# Patient Record
Sex: MALE | Race: WHITE | HISPANIC OR LATINO | Employment: UNEMPLOYED | URBAN - METROPOLITAN AREA
[De-identification: names, ages, dates, MRNs, and addresses within clinical notes are randomized per-mention and may not be internally consistent; named-entity substitution may affect disease eponyms.]

---
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
## 2023-09-05 NOTE — OP REPORT
DATE OF SERVICE:  03/26/2018    PREOPERATIVE DIAGNOSIS:  Acute appendicitis.    POSTOPERATIVE DIAGNOSIS:  Acute non-ruptured appendicitis with localized   peritonitis.    SURGEON:  Kira Lima MD    ANESTHESIOLOGIST:  Isaiah Dee MD    ANESTHESIA:  GET.    FINDINGS:  No signs of perforation, small bowel adherent to the cecum and   appendix as well as the right lower quadrant abdominal wall.    COMPLICATIONS:  None.    ESTIMATED BLOOD LOSS:  10 mL.    PROCEDURE IN DETAIL:  Patient was consented preoperatively with his parents   present, taken to the operating room, placed in a supine position.  Anesthesia   was induced.  He was endotracheally intubated without difficulty.  A time-out   was performed.  Preoperatively, patient urinated to decompress his bladder.    His abdomen was then was then prepped and draped.  A 2 cm supraumbilical   incision was made to place a 12 mm Chanelle port under direct visualization.    The abdomen was then insufflated to 12 mmHg.  A 5 mm 30-degree camera was then   placed within the abdomen and two 5 mm ports were placed, one in the   suprapubic region, one in the epigastrium with care not to injure the bladder.    Patient was then airplaned to his left side down.  He had a small bowel loop   that was adhesed to the cecum and the right lower quadrant abdominal wall had   to be carefully mobilized to expose the appendix.  The small bowel loop was   mobilized with a combination of blunt and sharp dissection with care not to   injure the bowel.  The cecum was then rotated slightly medially.  The appendix   was grasped and retracted towards the liver.  The mesoappendix divided with   harmonic device, exposing the base and the base was transected with Endo-PIERCE   stapler using a blue load.  The appendix was nonperforated.  There was   localized peritonitis in the right lower quadrant, but no sign of perforation.    The appendix placed in the EndoCatch bag was withdrawn from the abdomen.     Staple line was inspected, it was intact.  There was no sign of bleeding and   the right lower quadrant was then irrigated until the irrigant ran clear. The   5 mm ports were removed under direct visualization.  There was no bleeding   from the abdominal wall.  The abdomen was then desufflated.  The periumbilical   fascia incision closed with a figure-of-eight using 0 Vicryl.  All skin   incisions closed with a subcuticular stitch using 4-0 Monocryl, Steri-Strips,   Tegaderm dressing was then applied.  All lap, sponge and instrument counts   were correct at the end of the case x2.  The patient tolerated the procedure   well.  He was awakened from anesthesia, extubated, taken to the postanesthesia   care unit in good condition.       ____________________________________     MD JESSICA Tinoco / BELINDA    DD:  03/26/2018 09:53:55  DT:  03/26/2018 10:50:00    D#:  [id number]  Job#:  [id number]   show

## 2023-09-22 ENCOUNTER — HOSPITAL ENCOUNTER (EMERGENCY)
Facility: MEDICAL CENTER | Age: 18
End: 2023-09-23
Attending: STUDENT IN AN ORGANIZED HEALTH CARE EDUCATION/TRAINING PROGRAM
Payer: MEDICAID

## 2023-09-22 DIAGNOSIS — R20.2 PARESTHESIA OF RIGHT LOWER EXTREMITY: ICD-10-CM

## 2023-09-22 DIAGNOSIS — M54.50 CHRONIC MIDLINE LOW BACK PAIN, UNSPECIFIED WHETHER SCIATICA PRESENT: ICD-10-CM

## 2023-09-22 DIAGNOSIS — G89.29 CHRONIC MIDLINE LOW BACK PAIN, UNSPECIFIED WHETHER SCIATICA PRESENT: ICD-10-CM

## 2023-09-22 PROCEDURE — 99283 EMERGENCY DEPT VISIT LOW MDM: CPT

## 2023-09-23 ENCOUNTER — APPOINTMENT (OUTPATIENT)
Dept: RADIOLOGY | Facility: MEDICAL CENTER | Age: 18
End: 2023-09-23
Attending: STUDENT IN AN ORGANIZED HEALTH CARE EDUCATION/TRAINING PROGRAM
Payer: MEDICAID

## 2023-09-23 VITALS
HEART RATE: 67 BPM | DIASTOLIC BLOOD PRESSURE: 63 MMHG | OXYGEN SATURATION: 97 % | RESPIRATION RATE: 17 BRPM | WEIGHT: 178.35 LBS | SYSTOLIC BLOOD PRESSURE: 112 MMHG | TEMPERATURE: 97.3 F | BODY MASS INDEX: 27.99 KG/M2 | HEIGHT: 67 IN

## 2023-09-23 LAB
ALBUMIN SERPL BCP-MCNC: 5.1 G/DL (ref 3.2–4.9)
ALBUMIN/GLOB SERPL: 1.8 G/DL
ALP SERPL-CCNC: 69 U/L (ref 30–99)
ALT SERPL-CCNC: 15 U/L (ref 2–50)
ANION GAP SERPL CALC-SCNC: 14 MMOL/L (ref 7–16)
AST SERPL-CCNC: 19 U/L (ref 12–45)
BASOPHILS # BLD AUTO: 0.7 % (ref 0–1.8)
BASOPHILS # BLD: 0.04 K/UL (ref 0–0.12)
BILIRUB SERPL-MCNC: 0.4 MG/DL (ref 0.1–1.5)
BUN SERPL-MCNC: 20 MG/DL (ref 8–22)
CALCIUM ALBUM COR SERPL-MCNC: 8.3 MG/DL (ref 8.5–10.5)
CALCIUM SERPL-MCNC: 9.2 MG/DL (ref 8.5–10.5)
CHLORIDE SERPL-SCNC: 100 MMOL/L (ref 96–112)
CO2 SERPL-SCNC: 26 MMOL/L (ref 20–33)
CREAT SERPL-MCNC: 0.84 MG/DL (ref 0.5–1.4)
CRP SERPL HS-MCNC: <0.3 MG/DL (ref 0–0.75)
EOSINOPHIL # BLD AUTO: 0.04 K/UL (ref 0–0.51)
EOSINOPHIL NFR BLD: 0.7 % (ref 0–6.9)
ERYTHROCYTE [DISTWIDTH] IN BLOOD BY AUTOMATED COUNT: 38.1 FL (ref 35.9–50)
ERYTHROCYTE [SEDIMENTATION RATE] IN BLOOD BY WESTERGREN METHOD: 6 MM/HOUR (ref 0–20)
GFR SERPLBLD CREATININE-BSD FMLA CKD-EPI: 128 ML/MIN/1.73 M 2
GLOBULIN SER CALC-MCNC: 2.8 G/DL (ref 1.9–3.5)
GLUCOSE SERPL-MCNC: 88 MG/DL (ref 65–99)
HCT VFR BLD AUTO: 48.1 % (ref 42–52)
HGB BLD-MCNC: 16.2 G/DL (ref 14–18)
IMM GRANULOCYTES # BLD AUTO: 0.01 K/UL (ref 0–0.11)
IMM GRANULOCYTES NFR BLD AUTO: 0.2 % (ref 0–0.9)
LYMPHOCYTES # BLD AUTO: 1.9 K/UL (ref 1–4.8)
LYMPHOCYTES NFR BLD: 34.1 % (ref 22–41)
MCH RBC QN AUTO: 27.7 PG (ref 27–33)
MCHC RBC AUTO-ENTMCNC: 33.7 G/DL (ref 32.3–36.5)
MCV RBC AUTO: 82.4 FL (ref 81.4–97.8)
MONOCYTES # BLD AUTO: 0.32 K/UL (ref 0–0.85)
MONOCYTES NFR BLD AUTO: 5.7 % (ref 0–13.4)
NEUTROPHILS # BLD AUTO: 3.27 K/UL (ref 1.82–7.42)
NEUTROPHILS NFR BLD: 58.6 % (ref 44–72)
NRBC # BLD AUTO: 0 K/UL
NRBC BLD-RTO: 0 /100 WBC (ref 0–0.2)
PLATELET # BLD AUTO: 294 K/UL (ref 164–446)
PMV BLD AUTO: 9.8 FL (ref 9–12.9)
POTASSIUM SERPL-SCNC: 3.8 MMOL/L (ref 3.6–5.5)
PROT SERPL-MCNC: 7.9 G/DL (ref 6–8.2)
RBC # BLD AUTO: 5.84 M/UL (ref 4.7–6.1)
SODIUM SERPL-SCNC: 140 MMOL/L (ref 135–145)
WBC # BLD AUTO: 5.6 K/UL (ref 4.8–10.8)

## 2023-09-23 PROCEDURE — 80053 COMPREHEN METABOLIC PANEL: CPT

## 2023-09-23 PROCEDURE — 85025 COMPLETE CBC W/AUTO DIFF WBC: CPT

## 2023-09-23 PROCEDURE — 72148 MRI LUMBAR SPINE W/O DYE: CPT

## 2023-09-23 PROCEDURE — 36415 COLL VENOUS BLD VENIPUNCTURE: CPT

## 2023-09-23 PROCEDURE — 85652 RBC SED RATE AUTOMATED: CPT

## 2023-09-23 PROCEDURE — 86140 C-REACTIVE PROTEIN: CPT

## 2023-09-23 PROCEDURE — 72100 X-RAY EXAM L-S SPINE 2/3 VWS: CPT

## 2023-09-23 NOTE — ED NOTES
Bedside report received by Hugo MO    Oxygen:RA  Fall Risk status:N/A  No isolation    Pt aware of POC, call light within reach, no needs at this time
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
## 2023-09-23 NOTE — ED NOTES
Pt understands DC instructions and follow up, DC paperwork provided, IV removed, pt ambulated with steady gait to [facility] snow for DC

## 2023-09-23 NOTE — DISCHARGE INSTRUCTIONS
You were seen in the emergency room for evaluation of back pain.  You had a MRI of your L-spine done that shows no acute abnormalities. Please follow up with your primary care doctor. Return to the ER for any worsening back pain, urinary or bowel incontinence, inability to walk or any other concerns
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
## 2024-09-08 ENCOUNTER — HOSPITAL ENCOUNTER (EMERGENCY)
Facility: MEDICAL CENTER | Age: 19
End: 2024-09-08
Attending: STUDENT IN AN ORGANIZED HEALTH CARE EDUCATION/TRAINING PROGRAM
Payer: MEDICAID

## 2024-09-08 ENCOUNTER — APPOINTMENT (OUTPATIENT)
Dept: RADIOLOGY | Facility: MEDICAL CENTER | Age: 19
End: 2024-09-08
Attending: STUDENT IN AN ORGANIZED HEALTH CARE EDUCATION/TRAINING PROGRAM
Payer: MEDICAID

## 2024-09-08 VITALS
HEIGHT: 67 IN | HEART RATE: 60 BPM | TEMPERATURE: 98.2 F | DIASTOLIC BLOOD PRESSURE: 58 MMHG | OXYGEN SATURATION: 98 % | WEIGHT: 161.38 LBS | SYSTOLIC BLOOD PRESSURE: 116 MMHG | BODY MASS INDEX: 25.33 KG/M2 | RESPIRATION RATE: 16 BRPM

## 2024-09-08 DIAGNOSIS — S61.022A LACERATION OF LEFT THUMB WITH FOREIGN BODY WITHOUT DAMAGE TO NAIL, INITIAL ENCOUNTER: ICD-10-CM

## 2024-09-08 PROCEDURE — 303747 HCHG EXTRA SUTURE

## 2024-09-08 PROCEDURE — 700111 HCHG RX REV CODE 636 W/ 250 OVERRIDE (IP): Performed by: STUDENT IN AN ORGANIZED HEALTH CARE EDUCATION/TRAINING PROGRAM

## 2024-09-08 PROCEDURE — 304217 HCHG IRRIGATION SYSTEM

## 2024-09-08 PROCEDURE — 73130 X-RAY EXAM OF HAND: CPT | Mod: LT

## 2024-09-08 PROCEDURE — 99283 EMERGENCY DEPT VISIT LOW MDM: CPT

## 2024-09-08 PROCEDURE — 90715 TDAP VACCINE 7 YRS/> IM: CPT | Performed by: STUDENT IN AN ORGANIZED HEALTH CARE EDUCATION/TRAINING PROGRAM

## 2024-09-08 PROCEDURE — 90471 IMMUNIZATION ADMIN: CPT

## 2024-09-08 PROCEDURE — 700101 HCHG RX REV CODE 250: Performed by: STUDENT IN AN ORGANIZED HEALTH CARE EDUCATION/TRAINING PROGRAM

## 2024-09-08 PROCEDURE — 304999 HCHG REPAIR-SIMPLE/INTERMED LEVEL 1

## 2024-09-08 RX ORDER — LIDOCAINE HYDROCHLORIDE 20 MG/ML
20 INJECTION, SOLUTION INFILTRATION; PERINEURAL ONCE
Status: COMPLETED | OUTPATIENT
Start: 2024-09-08 | End: 2024-09-08

## 2024-09-08 RX ADMIN — LIDOCAINE HYDROCHLORIDE 20 ML: 20 INJECTION, SOLUTION INFILTRATION; PERINEURAL at 22:15

## 2024-09-08 RX ADMIN — CLOSTRIDIUM TETANI TOXOID ANTIGEN (FORMALDEHYDE INACTIVATED), CORYNEBACTERIUM DIPHTHERIAE TOXOID ANTIGEN (FORMALDEHYDE INACTIVATED), BORDETELLA PERTUSSIS TOXOID ANTIGEN (GLUTARALDEHYDE INACTIVATED), BORDETELLA PERTUSSIS FILAMENTOUS HEMAGGLUTININ ANTIGEN (FORMALDEHYDE INACTIVATED), BORDETELLA PERTUSSIS PERTACTIN ANTIGEN, AND BORDETELLA PERTUSSIS FIMBRIAE 2/3 ANTIGEN 0.5 ML: 5; 2; 2.5; 5; 3; 5 INJECTION, SUSPENSION INTRAMUSCULAR at 23:27

## 2024-09-08 RX ADMIN — Medication 3 ML: at 22:15

## 2024-09-08 ASSESSMENT — FIBROSIS 4 INDEX: FIB4 SCORE: 0.35

## 2024-09-09 NOTE — DISCHARGE INSTRUCTIONS
You will need to have your stitches taken out in 10 days.  You develop fever, pus from your wound, uncontrolled pain or swelling please return to emergency room as these could be signs of wound infection.  We updated your tetanus.  You can go to your primary care doctor and urgent care or come back to emergency department to have your stitches taken out.  Keep your wound dry for the next 24 hours.  After that you can bathe normally but no swimming or hot tubs.

## 2024-09-09 NOTE — ED PROVIDER NOTES
"ED Provider Note    CHIEF COMPLAINT  Chief Complaint   Patient presents with    Hand Laceration     Left hand       EXTERNAL RECORDS REVIEWED  Other ER visit from 9/23/2023 patient seen for low back pain    HPI/ROS  LIMITATION TO HISTORY     OUTSIDE HISTORIAN(S):      López De Souza is a 19 y.o. male who presents with hand laceration.  Patient says he was cleaning a ceramic mug when it accidentally broke and cut his left thumb.  Patient says he washed his injury with water and soap and had ongoing bleeding.  Patient says he had appropriate vaccinations as a child.    PAST MEDICAL HISTORY       SURGICAL HISTORY   has a past surgical history that includes appendectomy laparoscopic (3/26/2018).    FAMILY HISTORY  Family History   Problem Relation Age of Onset    No Known Problems Mother     No Known Problems Father     Hypertension Maternal Grandmother     Diabetes Maternal Grandfather     No Known Problems Paternal Grandmother     No Known Problems Paternal Grandfather        SOCIAL HISTORY  Social History     Tobacco Use    Smoking status: Never    Smokeless tobacco: Never   Vaping Use    Vaping status: Never Used   Substance and Sexual Activity    Alcohol use: Yes     Comment: rarely    Drug use: No    Sexual activity: Not on file       CURRENT MEDICATIONS  Home Medications       Reviewed by Jimmy Poon R.N. (Registered Nurse) on 09/08/24 at 2105  Med List Status: <None>     Medication Last Dose Status        Patient Zaid Taking any Medications                           ALLERGIES  No Known Allergies    PHYSICAL EXAM  VITAL SIGNS: /58   Pulse 60   Temp 36.8 °C (98.2 °F) (Temporal)   Resp 16   Ht 1.702 m (5' 7\")   Wt 73.2 kg (161 lb 6 oz)   SpO2 98%   BMI 25.28 kg/m²    Constitutional: Alert in no apparent distress.  HENT: No signs of trauma, Bilateral external ears normal, Nose normal.   Eyes: Pupils are equal and reactive, Conjunctiva normal, Non-icteric.   Neck: Normal range of motion, No " tenderness, Supple, No stridor.   Cardiovascular: Regular rate and rhythm, no murmurs.   Thorax & Lungs: Normal breath sounds, No respiratory distress, No wheezing, No chest tenderness.   Abdomen: Bowel sounds normal, Soft, No tenderness, No masses, No pulsatile masses.   Skin: Warm, Dry, No erythema, No rash.   Back: No bony tenderness  Extremities: Intact distal pulses, No edema, No tenderness, No cyanosis  Musculoskeletal: Approximately 5 cm laceration along the dorsum of the left thumb, normal flexion and extension throughout the left thumb, normal cap refill otherwise good range of motion in all major joints. No tenderness to palpation or major deformities noted.   Neurologic: Alert , Normal motor function, Normal sensory function, No focal deficits noted.         RADIOLOGY/PROCEDURES   I have independently interpreted the diagnostic imaging associated with this visit and am waiting the final reading from the radiologist.   My preliminary interpretation is as follows: No fracture of the left hand    Radiologist interpretation:  DX-HAND 3+ LEFT   Final Result         Tiny radiopaque foreign body in the soft tissue near the medial aspect of the first IP joint          COURSE & MEDICAL DECISION MAKING    ASSESSMENT, COURSE AND PLAN  Care Narrative: On arrival patient noted to have laceration across the dorsum of his left thumb.  Patient does not have any signs of neurovascular injury, tendon injury.  As patient reported that he injured his finger on a broken ceramic mug obtain x-ray to assess for radiopaque foreign body.  Patient did have a very small radiopaque foreign body along the medial aspect of his wound.  After performing digital nerve block as noted below was able to for patient's wound and identify small whitish foreign body.  Patient's wound was thoroughly irrigated with a liter of normal saline.  With pressure patient achieved adequate hemostasis.  Patient's laceration was repaired as noted below.   Patient was given updated tetanus.  Discussed with patient outpatient follow-up for suture removal as well as return precautions for signs of neurovascular compromise or wound infection.    Peripheral Block     Date: 9/8/24  Performed by: Jerald Zamudio DO  Authorized by: Jerald Zamudio DO     Patient Location:  ED  Start Time: 9/8/2024 6115  Reason for Block: Thumb laceration  Patient Position:  Supine  Prep: Isopropyl alcohol  Monitoring:  Heart rate, continuous pulse ox and cardiac monitor  Block Region: Left thumb  Block Type: Digital nerve block  Laterality: Left  Local Infiltration: Lidocaine  Strength:  2%  Dose:  5 mL  Block Type:  Single-shot  Needle Gauge:  22 G  Injection Assessment:  Negative aspiration for heme, no paresthesia on injection, incremental injection  Evidence of intravascular injection: No    Neurovascular status: Intact      Laceration Repair Procedure Note    Indication: Laceration    Procedure: The patient was placed in the appropriate position and anesthesia around the laceration was obtained with a full digital block of the left thumb using 2% Lidocaine without epinephrine. The wound was minimally contaminated .The area was then irrigated with normal saline. The laceration was closed with 5-0 Ethilon using interrupted sutures. There were no additional lacerations requiring repair. The wound area was then dressed with a sterile dressing.      Total repaired wound length: 5 cm.     Other Items: Suture count: 4    The patient tolerated the procedure well.    Complications: None                  ADDITIONAL PROBLEMS MANAGED      DISPOSITION AND DISCUSSIONS  Barriers to care at this time, including but not limited to: Patient does not have established PCP.       FINAL DIAGNOSIS  1. Laceration of left thumb with foreign body without damage to nail, initial encounter         Electronically signed by: Jerald Zamudio D.O., 9/8/2024 9:46 PM

## 2024-09-09 NOTE — ED NOTES
PT given dishcarge instructions, and all questions answered. PT discharged in stable condition. PT ambulated out to lobby with steady gait accompanied by significant other.

## 2024-09-09 NOTE — ED TRIAGE NOTES
"Chief Complaint   Patient presents with    Hand Laceration     Left hand     Patient ambulatory to triage accompanied by girlfriend for the above complaint. Patient states that he cut himself with a broken glass. Dressing applied in triage and bleeding controlled.    Patient unsure of last TDAP.    Pt is alert and oriented, speaking in full sentences, follows commands and responds appropriately to questions. Resp are even and unlabored.      Pt placed in lobby. Pt educated on triage process. Pt encouraged to alert staff for any changes.     Patient and staff wearing appropriate PPE.    /52   Pulse 65   Temp 36.6 °C (97.9 °F) (Temporal)   Resp 16   Ht 1.702 m (5' 7\")   Wt 73.2 kg (161 lb 6 oz)   SpO2 96%     "

## 2024-09-19 ENCOUNTER — OFFICE VISIT (OUTPATIENT)
Dept: URGENT CARE | Facility: CLINIC | Age: 19
End: 2024-09-19
Payer: MEDICAID

## 2024-09-19 VITALS
OXYGEN SATURATION: 98 % | DIASTOLIC BLOOD PRESSURE: 64 MMHG | HEIGHT: 68 IN | TEMPERATURE: 97.8 F | HEART RATE: 107 BPM | BODY MASS INDEX: 24.64 KG/M2 | SYSTOLIC BLOOD PRESSURE: 100 MMHG | RESPIRATION RATE: 12 BRPM | WEIGHT: 162.6 LBS

## 2024-09-19 DIAGNOSIS — Z48.02 VISIT FOR SUTURE REMOVAL: ICD-10-CM

## 2024-09-19 PROCEDURE — 3078F DIAST BP <80 MM HG: CPT

## 2024-09-19 PROCEDURE — 3074F SYST BP LT 130 MM HG: CPT

## 2024-09-19 PROCEDURE — 99212 OFFICE O/P EST SF 10 MIN: CPT

## 2024-09-19 PROCEDURE — 15853 REMOVAL SUTR/STAPL XREQ ANES: CPT

## 2024-09-19 ASSESSMENT — FIBROSIS 4 INDEX: FIB4 SCORE: 0.35

## 2024-09-19 ASSESSMENT — ENCOUNTER SYMPTOMS: FEVER: 0

## 2024-09-20 NOTE — PROGRESS NOTES
"Subjective:     CHIEF COMPLAINT  Chief Complaint   Patient presents with    Suture / Staple Removal     X1 week , wants stiches out .       HPI  López De Souza is a very pleasant 19 y.o. male who presents for a suture removal to a laceration to the dorsal surface of his left thumb.  He was seen in the emergency department on 9/8/2024, 11 days ago, and had 4 simple interrupted sutures placed.  He has been gently washing the laceration site with warm soapy water.  He has not noticed any drainage or redness.  He is otherwise feeling well.       REVIEW OF SYSTEMS  Review of Systems   Constitutional:  Negative for fever.       PAST MEDICAL HISTORY  Patient Active Problem List    Diagnosis Date Noted    Encounter to establish care 08/03/2020    Need for vaccination 08/03/2020       SURGICAL HISTORY   has a past surgical history that includes appendectomy laparoscopic (3/26/2018).    ALLERGIES  No Known Allergies    CURRENT MEDICATIONS  Home Medications       Reviewed by Penelope Webb P.A.-C. (Physician Assistant) on 09/19/24 at 1726  Med List Status: <None>     Medication Last Dose Status        Patient Zaid Taking any Medications                           SOCIAL HISTORY  Social History     Tobacco Use    Smoking status: Never    Smokeless tobacco: Never   Vaping Use    Vaping status: Never Used   Substance and Sexual Activity    Alcohol use: Yes     Comment: rarely    Drug use: Yes     Types: Marijuana     Comment: rare    Sexual activity: Not on file       FAMILY HISTORY  Family History   Problem Relation Age of Onset    No Known Problems Mother     No Known Problems Father     Hypertension Maternal Grandmother     Diabetes Maternal Grandfather     No Known Problems Paternal Grandmother     No Known Problems Paternal Grandfather           Objective:     VITAL SIGNS: /64   Pulse (!) 107   Temp 36.6 °C (97.8 °F)   Resp 12   Ht 1.727 m (5' 8\")   Wt 73.8 kg (162 lb 9.6 oz)   SpO2 98%   BMI 24.72 " kg/m²     PHYSICAL EXAM  Physical Exam  Vitals reviewed.   Constitutional:       General: He is not in acute distress.     Appearance: Normal appearance. He is not ill-appearing or toxic-appearing.   HENT:      Head: Normocephalic and atraumatic.      Mouth/Throat:      Mouth: Mucous membranes are moist.   Eyes:      Conjunctiva/sclera: Conjunctivae normal.      Pupils: Pupils are equal, round, and reactive to light.   Pulmonary:      Effort: Pulmonary effort is normal. No respiratory distress.   Musculoskeletal:        Hands:       Comments: 4 simple interrupted sutures in place.  No erythema, swelling, or drainage surrounding the laceration site.  No foreign bodies.  Wound edges are approximated with appropriate healing.   Skin:     General: Skin is warm and dry.   Neurological:      General: No focal deficit present.      Mental Status: He is alert and oriented to person, place, and time.   Psychiatric:         Mood and Affect: Mood normal.       Suture Removal    Date/Time: 9/19/2024 5:41 PM    Performed by: Penelope Webb P.A.-C.  Authorized by: Penelope Webb P.A.-C.  Body area: upper extremity  Location details: left thumb  Wound Appearance: clean  Sutures Removed: 4  Post-removal: Steri-Strips applied  Patient tolerance: patient tolerated the procedure well with no immediate complications  Comments: Distal portion of laceration without complete healing upon removal of sutures.  2 Steri-Strips applied.          Assessment/Plan:     1. Visit for suture removal    Other orders  - Suture Removal  -Tylenol over-the-counter for discomfort if needed  -Return to clinic as needed    MDM/Comments:  Patient has stable vital signs and is non-toxic appearing.  4 simple interrupted sutures have been removed in the office.  2 Steri-Strips have been placed to the distal portion of the laceration or incomplete healing was present.  No evidence of infection or contamination. Patient demonstrated understanding of  treatment plan at this time and will RTC as needed.    Differential diagnosis, natural history, supportive care, and indications for immediate follow-up discussed. All questions answered. Patient agrees with the plan of care.    Follow-up as needed if symptoms worsen or fail to improve to PCP, Urgent care or Emergency Room.    I have personally reviewed prior external notes and test results pertinent to today's visit.  I have independently reviewed and interpreted all diagnostics ordered during this urgent care acute visit.   Discussed management options (risks,benefits, and alternatives to treatment). Pt expresses understanding and the treatment plan was agreed upon. Questions were encouraged and answered to pt's satisfaction.    Please note that this dictation was created using voice recognition software. I have made a reasonable attempt to correct obvious errors, but I expect that there are errors of grammar and possibly content that I did not discover before finalizing the note.

## 2024-11-30 ENCOUNTER — OFFICE VISIT (OUTPATIENT)
Dept: URGENT CARE | Facility: CLINIC | Age: 19
End: 2024-11-30
Payer: MEDICAID

## 2024-11-30 VITALS
TEMPERATURE: 98 F | HEART RATE: 64 BPM | SYSTOLIC BLOOD PRESSURE: 110 MMHG | BODY MASS INDEX: 26.68 KG/M2 | OXYGEN SATURATION: 99 % | DIASTOLIC BLOOD PRESSURE: 72 MMHG | HEIGHT: 67 IN | RESPIRATION RATE: 16 BRPM | WEIGHT: 170 LBS

## 2024-11-30 DIAGNOSIS — S39.94XA INJURY TO SCROTUM, PENIS, OR FORESKIN, INITIAL ENCOUNTER: ICD-10-CM

## 2024-11-30 PROBLEM — M54.6 THORACIC BACK PAIN: Status: ACTIVE | Noted: 2024-01-27

## 2024-11-30 PROBLEM — M43.06 ACQUIRED SPONDYLOLYSIS OF LUMBAR SPINE: Status: ACTIVE | Noted: 2023-04-27

## 2024-11-30 PROBLEM — R63.8 INCREASED BODY MASS INDEX: Status: ACTIVE | Noted: 2023-09-28

## 2024-11-30 ASSESSMENT — FIBROSIS 4 INDEX: FIB4 SCORE: 0.35

## 2024-11-30 NOTE — LETTER
November 30, 2024         Patient: López De Souza   YOB: 2005   Date of Visit: 11/30/2024           To Whom it May Concern:    López De Souza was seen in my clinic on 11/30/2024. He may be excused from work today.    If you have any questions or concerns, please don't hesitate to call.        Sincerely,           LAUREEN Solis.  Electronically Signed

## 2024-12-01 NOTE — PROGRESS NOTES
Chief Complaint   Patient presents with    Penis Injury     Injury Happened 1 day ago, tip of penis possibly injured. Blood came out after intercourse.        HISTORY OF PRESENT ILLNESS: Patient is a pleasant 19 y.o. male who presents to urgent care today with concerns of an injury to his foreskin.  The patient was having sexual relations last night with his girlfriend when he felt pain and noticed bleeding to his foreskin.  He has had continued bleeding today.  He has washed the area with alcohol.  Denies any changes in urination.    Patient Active Problem List    Diagnosis Date Noted    Thoracic back pain 01/27/2024    Increased body mass index 09/28/2023    Acquired spondylolysis of lumbar spine 04/27/2023    Encounter to establish care 08/03/2020    Need for vaccination 08/03/2020       Allergies:Patient has no known allergies.    No current [employer]-ordered outpatient medications on file.     No current [employer]-ordered facility-administered medications on file.       History reviewed. No pertinent past medical history.    Social History     Tobacco Use    Smoking status: Never    Smokeless tobacco: Never   Vaping Use    Vaping status: Never Used   Substance Use Topics    Alcohol use: Yes     Comment: rarely    Drug use: Yes     Types: Marijuana     Comment: rare       Family Status   Relation Name Status    Mo  Alive    Fa  Alive    MGMo  Alive    MGFa  Alive    PGMo  Alive    PGFa  Alive   No partnership data on file     Family History   Problem Relation Age of Onset    No Known Problems Mother     No Known Problems Father     Hypertension Maternal Grandmother     Diabetes Maternal Grandfather     No Known Problems Paternal Grandmother     No Known Problems Paternal Grandfather        ROS:  Review of Systems   Constitutional: Negative for fever, chills, weight loss, malaise, and fatigue.   HENT: Negative for ear pain, nosebleeds, congestion, sore throat and neck pain.    Eyes: Negative for vision changes.   Neuro:  "Negative for headache, sensory changes, weakness, seizure, LOC.   Cardiovascular: Negative for chest pain, palpitations, orthopnea and leg swelling.   Respiratory: Negative for cough, sputum production, shortness of breath and wheezing.   Gastrointestinal: Negative for abdominal pain, nausea, vomiting or diarrhea.   Genitourinary: Positive for penile pain.  Negative for dysuria, urgency and frequency.  Musculoskeletal: Negative for falls, neck pain, back pain, joint pain, myalgias.   Skin: Negative for rash, diaphoresis.     Exam:  /72   Pulse 64   Temp 36.7 °C (98 °F) (Temporal)   Resp 16   Ht 1.702 m (5' 7\")   Wt 77.1 kg (170 lb)   SpO2 99%   General: well-nourished, well-developed male in NAD  Head: normocephalic, atraumatic  Eyes: PERRLA, no conjunctival injection, acuity grossly intact, lids normal.  Ears: normal shape and symmetry, no tenderness, no discharge. External canals are without any significant edema or erythema. Tympanic membranes are without any inflammation, no effusion. Gross auditory acuity is intact.  Nose: symmetrical without tenderness, no discharge.  Mouth/Throat: reasonable hygiene, no erythema, exudates or tonsillar enlargement.  Neck: no masses, range of motion within normal limits, no tracheal deviation. No obvious thyroid enlargement.   Lymph: no cervical adenopathy. No supraclavicular adenopathy.   Neuro: alert and oriented. Cranial nerves 1-12 grossly intact. No sensory deficit.   Cardiovascular: regular rate and rhythm. No edema.  Pulmonary: no distress. Chest is symmetrical with respiration, no wheezes, crackles, or rhonchi.   : Penis is uncircumcised, there is a 2 mm tear at base of foreskin.  No active bleeding.  Musculoskeletal: no clubbing, appropriate muscle tone, gait is stable.  Skin: warm, dry, intact, no clubbing, no cyanosis, no rashes.   Psych: appropriate mood, affect, judgement.         Assessment/Plan:  1. Injury to scrotum, penis, or foreskin, initial " encounter            Patient presents with very small tear of skin at the base of foreskin.  Do not see need for suture repair due to size.  Wound care discussed.  OTC antibiotic ointment encouraged.  Supportive care, differential diagnoses, and indications for immediate follow-up discussed with patient.   Pathogenesis of diagnosis discussed including typical length and natural progression.   Instructed to return to clinic or nearest emergency department for any change in condition, further concerns, or worsening of symptoms.  Patient states understanding of the plan of care and discharge instructions.        Please note that this dictation was created using voice recognition software. I have made every reasonable attempt to correct obvious errors, but I expect that there are errors of grammar and possibly content that I did not discover before finalizing the note.      LAUREEN Solis.

## (undated) DEVICE — SUTURE 0 VICRYL PLUS UR-6 - 27 INCH (36/BX)

## (undated) DEVICE — BLADE SURGICAL CLIPPER - (50EA/CA)

## (undated) DEVICE — PROTECTOR ULNA NERVE - (36PR/CA)

## (undated) DEVICE — KIT ROOM DECONTAMINATION

## (undated) DEVICE — TROCAR LAPSCP 100MM 12MM NTHRD - (6/BX)

## (undated) DEVICE — NEPTUNE 4 PORT MANIFOLD - (20/PK)

## (undated) DEVICE — SENSOR SPO2 NEO LNCS ADHESIVE (20/BX) SEE USER NOTES

## (undated) DEVICE — TUBE E-T HI-LO CUFF 6.0MM (10/PK)

## (undated) DEVICE — SET EXTENSION WITH 2 PORTS (48EA/CA) ***PART #2C8610 IS A SUBSTITUTE*****

## (undated) DEVICE — SODIUM CHL IRRIGATION 0.9% 1000ML (12EA/CA)

## (undated) DEVICE — SLEEVE, VASO, THIGH, MED

## (undated) DEVICE — MASK ANESTHESIA ADULT  - (100/CA)

## (undated) DEVICE — PACK LAP CHOLE OR - (2EA/CA)

## (undated) DEVICE — SET SUCTION/IRRIGATION WITH DISPOSABLE TIP (6/CA )PART #0250-070-520 IS A SUB

## (undated) DEVICE — TUBING CLEARLINK DUO-VENT - C-FLO (48EA/CA)

## (undated) DEVICE — SUTURE 4-0 MONOCRYL PLUS PS-1 - 27 INCH (36/BX)

## (undated) DEVICE — GLOVE SZ 6.5 BIOGEL PI MICRO - PF LF (50PR/BX)

## (undated) DEVICE — STAPLER 45MM ARTICULATING - ENDO (3EA/BX)

## (undated) DEVICE — SCISSORS 5MM CVD (6EA/BX)

## (undated) DEVICE — SEALER VESSEL HARMONIC ACE PLUS WITH ADVANCED HEMOSTASIS 36CM (1/EA)

## (undated) DEVICE — ELECTRODE DUAL RETURN W/ CORD - (50/PK)

## (undated) DEVICE — SET LEADWIRE 5 LEAD BEDSIDE DISPOSABLE ECG (1SET OF 5/EA)

## (undated) DEVICE — TROCAR 5X100 BLADED Z-THREAD - KII (6/BX)

## (undated) DEVICE — GLOVE BIOGEL PI INDICATOR SZ 7.0 SURGICAL PF LF - (50/BX 4BX/CA)

## (undated) DEVICE — TUBING INSUFFLATION - (10/BX)

## (undated) DEVICE — KIT ANESTHESIA W/CIRCUIT & 3/LT BAG W/FILTER (20EA/CA)

## (undated) DEVICE — SUCTION INSTRUMENT YANKAUER BULBOUS TIP W/O VENT (50EA/CA)

## (undated) DEVICE — BAG RETRIEVAL 10ML (10EA/BX)

## (undated) DEVICE — DRESSING TRANSPARENT FILM TEGADERM 4 X 4.75" (50EA/BX)"

## (undated) DEVICE — DETERGENT RENUZYME PLUS 10 OZ PACKET (50/BX)

## (undated) DEVICE — LACTATED RINGERS INJ 1000 ML - (14EA/CA 60CA/PF)

## (undated) DEVICE — ELECTRODE 850 FOAM ADHESIVE - HYDROGEL RADIOTRNSPRNT (50/PK)

## (undated) DEVICE — CANISTER SUCTION 3000ML MECHANICAL FILTER AUTO SHUTOFF MEDI-VAC NONSTERILE LF DISP  (40EA/CA)

## (undated) DEVICE — DRESSING TRANSPARENT FILM TEGADERM 2.375 X 2.75"  (100EA/BX)"

## (undated) DEVICE — HEAD HOLDER JUNIOR/ADULT

## (undated) DEVICE — SUTURE GENERAL

## (undated) DEVICE — GOWN WARMING STANDARD FLEX - (30/CA)

## (undated) DEVICE — STAPLE 45MM BLUE 4.5MM (12EA/BX)

## (undated) DEVICE — CHLORAPREP 26 ML APPLICATOR - ORANGE TINT(25/CA)